# Patient Record
Sex: FEMALE | Race: WHITE | NOT HISPANIC OR LATINO | Employment: UNEMPLOYED | URBAN - METROPOLITAN AREA
[De-identification: names, ages, dates, MRNs, and addresses within clinical notes are randomized per-mention and may not be internally consistent; named-entity substitution may affect disease eponyms.]

---

## 2020-11-17 ENCOUNTER — OFFICE VISIT (OUTPATIENT)
Dept: ORTHOPEDIC SURGERY | Facility: CLINIC | Age: 50
End: 2020-11-17

## 2020-11-17 VITALS — HEART RATE: 99 BPM | WEIGHT: 252 LBS | OXYGEN SATURATION: 98 % | BODY MASS INDEX: 36.08 KG/M2 | HEIGHT: 70 IN

## 2020-11-17 DIAGNOSIS — M70.62 TROCHANTERIC BURSITIS OF LEFT HIP: Primary | ICD-10-CM

## 2020-11-17 DIAGNOSIS — M25.559 HIP PAIN: ICD-10-CM

## 2020-11-17 PROCEDURE — 20610 DRAIN/INJ JOINT/BURSA W/O US: CPT | Performed by: ORTHOPAEDIC SURGERY

## 2020-11-17 PROCEDURE — 99204 OFFICE O/P NEW MOD 45 MIN: CPT | Performed by: ORTHOPAEDIC SURGERY

## 2020-11-17 RX ORDER — PANTOPRAZOLE SODIUM 40 MG/1
TABLET, DELAYED RELEASE ORAL
COMMUNITY
Start: 2020-09-23 | End: 2021-03-04

## 2020-11-17 RX ORDER — BUPIVACAINE HYDROCHLORIDE 2.5 MG/ML
3 INJECTION, SOLUTION EPIDURAL; INFILTRATION; INTRACAUDAL
Status: COMPLETED | OUTPATIENT
Start: 2020-11-17 | End: 2020-11-17

## 2020-11-17 RX ORDER — FOSFOMYCIN TROMETHAMINE 3 G/1
POWDER ORAL
COMMUNITY
Start: 2020-09-24 | End: 2021-03-04

## 2020-11-17 RX ORDER — ASPIRIN 325 MG
325 TABLET ORAL DAILY
COMMUNITY
End: 2021-03-04

## 2020-11-17 RX ORDER — SERTRALINE HYDROCHLORIDE 100 MG/1
TABLET, FILM COATED ORAL
COMMUNITY
Start: 2020-09-23 | End: 2021-03-04

## 2020-11-17 RX ORDER — MELOXICAM 15 MG/1
TABLET ORAL
Qty: 60 TABLET | Refills: 0 | Status: SHIPPED | OUTPATIENT
Start: 2020-11-17 | End: 2021-01-11

## 2020-11-17 RX ORDER — ROSUVASTATIN CALCIUM 10 MG/1
TABLET, COATED ORAL
COMMUNITY
Start: 2020-09-23 | End: 2021-03-04

## 2020-11-17 RX ORDER — UBIDECARENONE 100 MG
100 CAPSULE ORAL DAILY
COMMUNITY
End: 2021-03-04

## 2020-11-17 RX ORDER — LIDOCAINE HYDROCHLORIDE 10 MG/ML
3 INJECTION, SOLUTION EPIDURAL; INFILTRATION; INTRACAUDAL; PERINEURAL
Status: COMPLETED | OUTPATIENT
Start: 2020-11-17 | End: 2020-11-17

## 2020-11-17 RX ORDER — TRIAMCINOLONE ACETONIDE 40 MG/ML
80 INJECTION, SUSPENSION INTRA-ARTICULAR; INTRAMUSCULAR
Status: COMPLETED | OUTPATIENT
Start: 2020-11-17 | End: 2020-11-17

## 2020-11-17 RX ADMIN — TRIAMCINOLONE ACETONIDE 80 MG: 40 INJECTION, SUSPENSION INTRA-ARTICULAR; INTRAMUSCULAR at 09:50

## 2020-11-17 RX ADMIN — BUPIVACAINE HYDROCHLORIDE 3 ML: 2.5 INJECTION, SOLUTION EPIDURAL; INFILTRATION; INTRACAUDAL at 09:50

## 2020-11-17 RX ADMIN — LIDOCAINE HYDROCHLORIDE 3 ML: 10 INJECTION, SOLUTION EPIDURAL; INFILTRATION; INTRACAUDAL; PERINEURAL at 09:50

## 2020-11-17 NOTE — PROGRESS NOTES
Procedure   Large Joint Arthrocentesis: L greater trochanteric bursa  Date/Time: 11/17/2020 9:50 AM  Consent given by: patient  Site marked: site marked  Timeout: Immediately prior to procedure a time out was called to verify the correct patient, procedure, equipment, support staff and site/side marked as required   Supporting Documentation  Indications: pain   Procedure Details  Location: hip - L greater trochanteric bursa  Preparation: Patient was prepped and draped in the usual sterile fashion  Needle size: 22 G  Approach: lateral  Medications administered: 80 mg triamcinolone acetonide 40 MG/ML; 3 mL lidocaine PF 1% 1 %; 3 mL bupivacaine (PF) 0.25 %  Patient tolerance: patient tolerated the procedure well with no immediate complications

## 2020-11-17 NOTE — PROGRESS NOTES
Orthopaedic Clinic Note: Hip New Patient    Chief Complaint   Patient presents with   • Left Hip - Pain        HPI    ELSY Yeh is a 49 y.o. female who presents with left hip pain for 3 month(s). Onset atraumatic and gradual in nature. Pain is localized to lateral trochanter and gluteal region and is a 7/10 on the pain scale.Pain is described as aching, stabbing and shooting. Associated symptoms include pain and grinding.  The pain is worse with walking, sitting, climbing stairs, sleeping and leisure; resting, heat and pain medication and/or NSAID improve the pain. Previous treatments have included: NSAIDS and physical therapy since symptom onset. Although some transient relief was reported with these interventions, these conservative measures have failed and symptoms have persisted. The patient is limited in daily activities and has had a significant decrease in quality of life as a result. She denies fevers, chills, or constitutional symptoms.    I have reviewed the following portions of the patient's history:History of Present Illness    History reviewed. No pertinent past medical history.   Past Surgical History:   Procedure Laterality Date   • BLADDER SURGERY     • BREAST AUGMENTATION     • CARDIAC CATHETERIZATION     • GALLBLADDER SURGERY     • HYSTERECTOMY     • KNEE SURGERY Left     arthroscopy   • TONSILLECTOMY AND ADENOIDECTOMY        Family History   Problem Relation Age of Onset   • Atrial fibrillation Mother    • Cancer Father      Social History     Socioeconomic History   • Marital status:      Spouse name: Not on file   • Number of children: Not on file   • Years of education: Not on file   • Highest education level: Not on file   Tobacco Use   • Smoking status: Never Smoker   • Smokeless tobacco: Never Used   Substance and Sexual Activity   • Alcohol use: Not Currently   • Drug use: Never   • Sexual activity: Defer      Current Outpatient Medications on File Prior to Visit  "  Medication Sig Dispense Refill   • aspirin 325 MG tablet Take 325 mg by mouth Daily.     • coenzyme Q10 100 MG capsule Take 100 mg by mouth Daily.     • metoprolol tartrate (LOPRESSOR) 25 MG tablet Take 25 mg by mouth 2 (Two) Times a Day.     • Monurol 3 g pack      • pantoprazole (PROTONIX) 40 MG EC tablet      • rosuvastatin (CRESTOR) 10 MG tablet      • sertraline (ZOLOFT) 100 MG tablet        No current facility-administered medications on file prior to visit.       Allergies   Allergen Reactions   • Morphine Rash   • Penicillins Rash        Review of Systems   Constitutional: Negative.    HENT: Negative.    Eyes: Negative.    Respiratory: Negative.    Cardiovascular: Negative.    Gastrointestinal: Negative.    Endocrine: Negative.    Genitourinary: Negative.    Musculoskeletal: Positive for arthralgias.   Skin: Negative.    Allergic/Immunologic: Negative.    Neurological: Negative.    Hematological: Negative.    Psychiatric/Behavioral: Negative.         The patient's Review of Systems was personally reviewed and confirmed as accurate.    The following portions of the patient's history were reviewed and updated as appropriate: allergies, current medications, past family history, past medical history, past social history, past surgical history and problem list.    Physical Exam  Pulse 99, height 177 cm (69.69\"), weight 114 kg (252 lb), SpO2 98 %.    Body mass index is 36.49 kg/m².    GENERAL APPEARANCE: awake, alert & oriented x 3, in no acute distress and well developed, well nourished  PSYCH: normal affect  LUNGS:  breathing nonlabored  EYES: sclera anicteric  CARDIOVASCULAR: palpable dorsalis pedis, palpable posterior tibial bilaterally. Capillary refill less than 2 seconds  EXTREMITIES: no clubbing, cyanosis  GAIT:  Normal           Right Hip Exam:  RANGE OF MOTION:   FLEXION CONTRACTURE: None   FLEXION: 110 degrees   INTERNAL ROTATION: 20 degrees at 90 degrees of flexion   EXTERNAL ROTATION: 40 degrees at " 90 degrees of flexion    PAIN WITH HIP MOTION: no  PAIN WITH LOGROLL: no  STINCHFIELD TEST: negative    KNEE EXAM: full knee ROM (0-120 degrees), stable to varus and valgus stress at terminal extension and 30 degrees flexion     STRENGTH:  5/5 hip adduction, abduction, flexion. 5/5 strength knee flexion, extension. 5/5 strength ankle dorsiflexion and plantarflexion.     GREATER TROCHANTER BURSAL PAIN:  yes     REFLEXES:   PATELLAR 2+/4   ACHILLES 2+/4    CLONUS: negative  STRAIGHT LEG TEST:   negative    SENSATION TO LIGHT TOUCH:  DEEP PERONEAL/SUPERFICIAL PERONEAL/SURAL/SAPHENOUS/TIBIAL:  intact    EDEMA:   no  ERYTHEMA:  no  WOUNDS/INCISIONS: no overlying skin problems.      Left Hip Exam:   RANGE OF MOTION:   FLEXION CONTRACTURE: None   FLEXION: 110 degrees   INTERNAL ROTATION: 20 degrees at 90 degrees of flexion   EXTERNAL ROTATION: 40 degrees at 90 degrees of flexion    PAIN WITH HIP MOTION: no  PAIN WITH LOGROLL: no  STINCHFIELD TEST: negative    KNEE EXAM: full knee ROM (0-120 degrees), stable to varus and valgus stress at terminal extension and 30 degrees flexion     STRENGTH:  5/5 hip adduction, abduction, flexion. 5/5 strength knee flexion, extension. 5/5 strength ankle dorsiflexion and plantarflexion.     GREATER TROCHANTER BURSAL PAIN:  yes     REFLEXES:   PATELLAR 2+/4   ACHILLES 2+/4    CLONUS: negative  STRAIGHT LEG TEST:   negative    SENSATION TO LIGHT TOUCH:  DEEP PERONEAL/SUPERFICIAL PERONEAL/SURAL/SAPHENOUS/TIBIAL:  intact    EDEMA:   no  ERYTHEMA:  no  WOUNDS/INCISIONS: no overlying skin problems.      ------------------------------------------------------------------    LEG LENGTHS:  equal  _____________________________________________________  _____________________________________________________    RADIOGRAPHIC FINDINGS:   Indication: Left hip pain    Comparison: No prior xrays are available for comparison    AP pelvis, hip 2 views: Right: mild joint space narrowing, minimal osteophyte  formation; Left: mild joint space narrowing, minimal osteophyte formation    Assessment/Plan:   Diagnosis Plan   1. Trochanteric bursitis of left hip  meloxicam (MOBIC) 15 MG tablet    Large Joint Arthrocentesis: L greater trochanteric bursa   2. Hip pain  XR Hip With or Without Pelvis 2 - 3 View Left     Patient's hip pain is due to trochanteric bursitis.  I discussed treatment options.  She is agreeable to trochanteric bursa cortisone injection today as well as prescription anti-inflammatory.  She will follow-up in 2 months for repeat assessment.    Williams Lyons MD  11/17/20  10:00 EST

## 2021-01-09 DIAGNOSIS — M70.62 TROCHANTERIC BURSITIS OF LEFT HIP: ICD-10-CM

## 2021-01-11 RX ORDER — MELOXICAM 15 MG/1
TABLET ORAL
Qty: 60 TABLET | Refills: 0 | Status: SHIPPED | OUTPATIENT
Start: 2021-01-11 | End: 2021-02-09

## 2021-02-09 DIAGNOSIS — M70.62 TROCHANTERIC BURSITIS OF LEFT HIP: ICD-10-CM

## 2021-02-09 RX ORDER — MELOXICAM 15 MG/1
TABLET ORAL
Qty: 60 TABLET | Refills: 0 | Status: SHIPPED | OUTPATIENT
Start: 2021-02-09 | End: 2021-03-04

## 2021-03-04 ENCOUNTER — OFFICE VISIT (OUTPATIENT)
Dept: OBSTETRICS AND GYNECOLOGY | Facility: CLINIC | Age: 51
End: 2021-03-04

## 2021-03-04 VITALS — HEIGHT: 71 IN | WEIGHT: 253 LBS | BODY MASS INDEX: 35.42 KG/M2

## 2021-03-04 DIAGNOSIS — Z01.419 ENCOUNTER FOR ANNUAL ROUTINE GYNECOLOGICAL EXAMINATION: Primary | ICD-10-CM

## 2021-03-04 PROCEDURE — 99386 PREV VISIT NEW AGE 40-64: CPT | Performed by: OBSTETRICS & GYNECOLOGY

## 2021-03-04 RX ORDER — SERTRALINE HYDROCHLORIDE 100 MG/1
TABLET, FILM COATED ORAL
COMMUNITY

## 2021-03-04 RX ORDER — METOPROLOL SUCCINATE 25 MG/1
TABLET, EXTENDED RELEASE ORAL
COMMUNITY

## 2021-03-04 RX ORDER — CONJUGATED ESTROGENS 0.62 MG/G
CREAM VAGINAL
COMMUNITY
End: 2021-03-04 | Stop reason: SDUPTHER

## 2021-03-04 RX ORDER — CONJUGATED ESTROGENS 0.62 MG/G
CREAM VAGINAL 2 TIMES WEEKLY
Qty: 1 EACH | Refills: 6 | Status: SHIPPED | OUTPATIENT
Start: 2021-03-04

## 2021-03-04 RX ORDER — ROSUVASTATIN CALCIUM 10 MG/1
TABLET, COATED ORAL
COMMUNITY

## 2021-03-04 RX ORDER — MULTIPLE VITAMINS W/ MINERALS TAB 9MG-400MCG
TAB ORAL
COMMUNITY

## 2021-03-04 RX ORDER — ESOMEPRAZOLE MAGNESIUM 40 MG/1
CAPSULE, DELAYED RELEASE ORAL
COMMUNITY

## 2021-03-04 RX ORDER — CHOLECALCIFEROL (VITAMIN D3) 10(400)/ML
DROPS ORAL
COMMUNITY

## 2021-03-04 NOTE — PROGRESS NOTES
GYN Annual Exam     CC - Here for Annual Exam.     Subjective     HPI  Irma Yeh is a 50 y.o. female, , who presents for annual well woman exam.  She is postmenopausal.  Patient reports problems with: none.  Partner Status: Marital Status: .  New Partners since last visit: no.   She Reports vasomotor symptoms.  She denies  issues with urinary incontinence.     The patient has any complaints today. Patient reports constant UTI's. Last treatment was four weeks ago. She had urosepsis in 2019, and is concerned about recurrent UTIs. Has est care with  urology. She is on premarin vaginal cream per urology. 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 She and her  have moved from [place], her  at [facility].     She exercises regularly: yes.  She has concerns about domestic violence: no.        Additional OB/GYN History       Last Pap :   Last Completed Pap Smear       Status Date      PAP SMEAR No completions recorded        History of abnormal Pap smear: yes - around age 19  Family history of uterine, colon, breast, or ovarian cancer: no  Performs monthly Self-Breast Exam: no  Last mammogram:   Last Completed Mammogram       Status Date      MAMMOGRAM Done 2019 normal        Last colonoscopy: Never   Last Completed Colonoscopy       Status Date      COLONOSCOPY No completions recorded        Last DEXA: about three years ago. Normal   Exercises Regularly: yes  Feelings of Anxiety or Depression: yes - treated    Tobacco Usage?: No   OB History        3    Para   2    Term   2            AB   1    Living   2       SAB   1    TAB        Ectopic        Molar        Multiple        Live Births   2                Health Maintenance   Topic Date Due   • COLONOSCOPY  1969   • ANNUAL PHYSICAL  1972   • TDAP/TD VACCINES (1 - Tdap) 1988   • ZOSTER VACCINE (1 of 2) 2019   • INFLUENZA VACCINE  2020   • HEPATITIS C SCREENING  2020   • PAP SMEAR  " 11/17/2020   • MAMMOGRAM  02/01/2021   • Annual Gynecologic Pelvic and Breast Exam  03/05/2022   • Pneumococcal Vaccine 0-64  Aged Out   • MENINGOCOCCAL VACCINE  Aged Out       The additional following portions of the patient's history were reviewed and updated as appropriate: allergies, current medications, past family history, past medical history, past social history, past surgical history and problem list.    Past Medical History:   Diagnosis Date   • H/O blood clots         Past Surgical History:   Procedure Laterality Date   • BLADDER REPAIR     • BLADDER SURGERY     • BREAST AUGMENTATION     • CARDIAC CATHETERIZATION     • GALLBLADDER SURGERY     • HYSTERECTOMY     • KNEE SURGERY Left     arthroscopy   • TONSILLECTOMY AND ADENOIDECTOMY          Review of Systems   Constitutional: Negative.    HENT: Negative.    Eyes: Negative.    Respiratory: Negative.    Cardiovascular: Negative.    Gastrointestinal: Negative.    Endocrine: Negative.    Genitourinary: Negative.    Musculoskeletal: Negative.    Skin: Negative.    Allergic/Immunologic: Negative.    Neurological: Negative.    Hematological: Negative.    Psychiatric/Behavioral: Negative.        I have reviewed and agree with the HPI, ROS, and historical information as entered above. Brenda Becerra MD    Objective   Ht 180.3 cm (71\")   Wt 115 kg (253 lb)   LMP  (LMP Unknown)   BMI 35.29 kg/m²     Physical Exam  Vitals signs and nursing note reviewed. Exam conducted with a chaperone present.   Constitutional:       Appearance: She is well-developed.   HENT:      Head: Normocephalic and atraumatic.   Eyes:      Pupils: Pupils are equal, round, and reactive to light.   Neck:      Musculoskeletal: Normal range of motion. No muscular tenderness.      Thyroid: No thyroid mass or thyromegaly.   Pulmonary:      Effort: Pulmonary effort is normal. No retractions.   Chest:      Chest wall: No mass.      Breasts:         Right: Normal. No mass, nipple discharge, skin " change or tenderness.         Left: Normal. No mass, nipple discharge, skin change or tenderness.   Abdominal:      General: Bowel sounds are normal.      Palpations: Abdomen is soft. Abdomen is not rigid. There is no mass.      Tenderness: There is no abdominal tenderness. There is no guarding.      Hernia: No hernia is present. There is no hernia in the left inguinal area or right inguinal area.   Genitourinary:     Exam position: Lithotomy position.      Pubic Area: No rash.       Labia:         Right: No rash, tenderness or lesion.         Left: No rash, tenderness or lesion.       Urethra: No urethral pain or urethral swelling.      Vagina: Normal. No vaginal discharge or lesions.      Cervix: No cervical motion tenderness, discharge, lesion or cervical bleeding.      Uterus: Normal. Not enlarged, not fixed and not tender.       Adnexa:         Right: No mass, tenderness or fullness.          Left: No mass, tenderness or fullness.        Rectum: No external hemorrhoid.   Musculoskeletal:      Right lower leg: No edema.      Left lower leg: No edema.   Skin:     General: Skin is warm and dry.   Neurological:      Mental Status: She is alert and oriented to person, place, and time.      Motor: Motor function is intact.   Psychiatric:         Mood and Affect: Mood and affect normal.         Behavior: Behavior normal.         Assessment/Plan     Assessment     Problem List Items Addressed This Visit     None      Visit Diagnoses     Encounter for annual routine gynecological examination    -  Primary    Relevant Orders    Liquid-based Pap Smear, Screening    Mammo Screening Digital Tomosynthesis Bilateral With CAD    DEXA Bone Density Axial          Plan     1. Reviewed monthly self breast exams.  Instructed to call with lumps, pain, or breast discharge.  Yearly mammograms ordered.  2. Ordered mammogram today.  3. Recommended use of Vitamin D and getting adequate calcium in her diet. (1500mg)  4. Osteoporosis  screening ordered today.  5. Colonoscopy recommended.  6. RTC in 1 year or PRN with problems.      Brenda Becerra MD  03/04/2021

## 2021-03-10 DIAGNOSIS — Z01.419 ENCOUNTER FOR ANNUAL ROUTINE GYNECOLOGICAL EXAMINATION: ICD-10-CM

## 2021-03-13 ENCOUNTER — APPOINTMENT (OUTPATIENT)
Dept: MAMMOGRAPHY | Facility: HOSPITAL | Age: 51
End: 2021-03-13

## 2021-03-19 ENCOUNTER — APPOINTMENT (OUTPATIENT)
Dept: MAMMOGRAPHY | Facility: HOSPITAL | Age: 51
End: 2021-03-19

## 2021-03-25 ENCOUNTER — APPOINTMENT (OUTPATIENT)
Dept: OTHER | Facility: HOSPITAL | Age: 51
End: 2021-03-25

## 2021-03-25 ENCOUNTER — HOSPITAL ENCOUNTER (OUTPATIENT)
Dept: MAMMOGRAPHY | Facility: HOSPITAL | Age: 51
Discharge: HOME | End: 2021-03-25
Admitting: OBSTETRICS & GYNECOLOGY

## 2021-03-25 DIAGNOSIS — Z01.419 ENCOUNTER FOR ANNUAL ROUTINE GYNECOLOGICAL EXAMINATION: ICD-10-CM

## 2021-03-25 DIAGNOSIS — Z92.89 H/O MAMMOGRAM: ICD-10-CM

## 2021-03-25 PROCEDURE — 77067 SCR MAMMO BI INCL CAD: CPT | Performed by: RADIOLOGY

## 2021-03-25 PROCEDURE — 77063 BREAST TOMOSYNTHESIS BI: CPT | Performed by: RADIOLOGY

## 2021-03-25 PROCEDURE — 77067 SCR MAMMO BI INCL CAD: CPT

## 2021-03-25 PROCEDURE — 77063 BREAST TOMOSYNTHESIS BI: CPT

## 2021-08-02 ENCOUNTER — LAB (OUTPATIENT)
Dept: LAB | Facility: HOSPITAL | Age: 51
End: 2021-08-02

## 2021-08-02 ENCOUNTER — OFFICE VISIT (OUTPATIENT)
Dept: FAMILY MEDICINE CLINIC | Facility: CLINIC | Age: 51
End: 2021-08-02

## 2021-08-02 VITALS
OXYGEN SATURATION: 94 % | BODY MASS INDEX: 35.11 KG/M2 | SYSTOLIC BLOOD PRESSURE: 118 MMHG | HEART RATE: 80 BPM | DIASTOLIC BLOOD PRESSURE: 74 MMHG | HEIGHT: 71 IN | RESPIRATION RATE: 20 BRPM | WEIGHT: 250.8 LBS

## 2021-08-02 DIAGNOSIS — Z12.11 COLON CANCER SCREENING: ICD-10-CM

## 2021-08-02 DIAGNOSIS — J00 ACUTE NASOPHARYNGITIS: Primary | ICD-10-CM

## 2021-08-02 DIAGNOSIS — J00 ACUTE NASOPHARYNGITIS: ICD-10-CM

## 2021-08-02 PROCEDURE — U0004 COV-19 TEST NON-CDC HGH THRU: HCPCS

## 2021-08-02 PROCEDURE — 99203 OFFICE O/P NEW LOW 30 MIN: CPT | Performed by: FAMILY MEDICINE

## 2021-08-02 RX ORDER — ASPIRIN 325 MG
325 TABLET, DELAYED RELEASE (ENTERIC COATED) ORAL DAILY
COMMUNITY

## 2021-08-02 RX ORDER — UBIDECARENONE 100 MG
100 CAPSULE ORAL DAILY
COMMUNITY

## 2021-08-02 NOTE — PROGRESS NOTES
"Subjective   Irma Yeh is a 50 y.o. female.     Chief Complaint   Patient presents with   • Establish Care   • Sore Throat   • Headache   • Diarrhea       History of Present Illness     Previous primary care: She sees a physician in [place], [place].    Chronic health conditions:  Kidney stones, tendinitis, and bursitis in left leg.    Other physicians currently involved in patient's care:  Dr. Becerra, Dr. Solis, Dr. Ramires, Dr. Mortensen.    Acute complaints:  Irma Yeh is a 50 y.o. female who presents today to establish care.   works for [employer], so they moved around a lot, so home is in [place]. She states she has not had a colonoscopy in the past. She had her physical exam 1 year ago.     She reports that she was taking B12 injections for a while after her hysterectomy due to fatigue. During that time, she states she was admitted to the hospital in [place] for SVT, so she stopped taking the B12 because she thought that may have caused her SVT. She states that she now takes vitamin D, and a multivitamin. She denies having any further episodes of SVT.    She saw Dr. Becerra for her yearly Pap test, and mammogram. She does not see anyone for the urinary tract infections or the 2 kidney stones because they do not bother her.     She reports approximately 2.5 years ago she went to her child's conference at school, and afterward she was experiencing pain in her fingers, and her throat was \"scratchy.\" She states that she was quivering, and shaking, and the next thing she knew, she was in an ambulance, and found to be septic with a UTI, and strep throat.  This occurred in 05/2018.    She states she had blood clots that occurred after she had her hysterectomy; she also had a gallbladder repair, hernia repair, and a rectocele all at the same time. She adds that she was still on birth control until that time. She completed 3 months of Xarelto. This occurred in 2011.    She adds that she " takes Zoloft prescribed by Dr. Becerra, her OB/GYN.    She states that she sees a cardiologist because she had an ablation in 2001, due to atrial ectopic tachycardia at [facility]. When she had her first child, there was a question of fluid overload, and cardiomyopathy, but they think it was just fluid overload because her EF resolved. She takes metoprolol, CoQ10, and Crestor for coverage. She gets an echocardiogram every year in [place]. She states that she sees Dr. Ramires, and Dr. Solis at .      She reports that on Friday, she had diarrhea, sore throat, and headache for 3 days. She had a strep test, and a COVID test, which were negative. She reports her throat is still sore. She adds that she no longer has the loose stools. She reports that her child had similar symptoms. She denies having a fever.     She states she had her tonsils removed when she was a baby.    She denies having a family history of colon cancer.     She states she has not had her COVID vaccine. She adds that she used to work for [employer]. She spoke to Dr. Mortensen, who told her it would be the safest vaccine for her. The next day is when she had the blood clot, so did not take the vaccine. She waited, and talked to him again, and she was advised to wear a mask, wash her hands, and stay out of crowds.     She reports she tore her gluteus medius before she moved here, and states that she has tendinitis, and bursitis in her left leg, and has received injections for that, and physical therapy.    Taco Yeh is a 50-year-old female who is present today to establish care. Her  works for [employer], so they moved around a lot, so home is in [place]. She states she has not had a colonoscopy in the past. She had her physical exam 1 year ago.     She reports that she was taking B12 injections for a while after her hysterectomy due to fatigue. During that time, she states she was admitted to the hospital in [place] for SVT,  "so she stopped taking the B12 because she thought that may have caused her SVT. She states that she now takes vitamin D, and a multivitamin. She denies having any further episodes of SVT.    She saw Dr. Becerra for her yearly Pap test, and mammogram. She does not see anyone for the urinary tract infections or the 2 kidney stones because they do not bother her.     She reports approximately 2.5 years ago she went to her child's conference at school, and afterward she was experiencing pain in her fingers, and her throat was \"scratchy.\" She states that she was quivering, and shaking, and the next thing she knew, she was in an ambulance, and found to be septic with a UTI, and strep throat.  This occurred in 05/2018.    She states she had blood clots that occurred after she had her hysterectomy; she also had a gallbladder repair, hernia repair, and a rectocele all at the same time. She adds that she was still on birth control until that time. She completed 3 months of Xarelto. This occurred in 2011.    She adds that she takes Zoloft prescribed by Dr. Becerra, her OB/GYN.    She states that she sees a cardiologist because she had an ablation in 2001, due to atrial ectopic tachycardia at [facility]. When she had her first child, there was a question of fluid overload, and cardiomyopathy, but they think it was just fluid overload because her EF resolved. She takes metoprolol, CoQ10, and Crestor for coverage. She gets an echocardiogram every year in [place]. She states that she sees Dr. Ramires, and Dr. Solis at .      She reports that on Friday, she had diarrhea, sore throat, and headache for 3 days. She had a strep test, and a COVID test, which were negative. She reports her throat is still sore. She adds that she no longer has the loose stools. She reports that her child had similar symptoms. She denies having a fever.     She states she had her tonsils removed when she was a baby.    She denies having a family history of " colon cancer.     She states she has not had her COVID vaccine. She adds that she used to work for [employer]. She spoke to Dr. Mortensen, who told her it would be the safest vaccine for her. The next day is when she had the blood clot, so did not take the vaccine. She waited, and talked to him again, and she was advised to wear a mask, wash her hands, and stay out of crowds.     She reports she tore her gluteus medius before she moved here, and states that she has tendinitis, and bursitis in her left leg, and has received injections for that, and physical therapy.        This patient is accompanied by their self who contributes to the history of their care.    The following portions of the patient's history were reviewed and updated as appropriate: allergies, current medications, past family history, past medical history, past social history, past surgical history and problem list.    Active Ambulatory Problems     Diagnosis Date Noted   • No Active Ambulatory Problems     Resolved Ambulatory Problems     Diagnosis Date Noted   • No Resolved Ambulatory Problems     Past Medical History:   Diagnosis Date   • H/O blood clots 2011   • Hyperlipidemia      Past Surgical History:   Procedure Laterality Date   • BLADDER REPAIR     • BLADDER SURGERY     • BREAST AUGMENTATION     • CARDIAC CATHETERIZATION     • GALLBLADDER SURGERY     • HYSTERECTOMY     • KNEE SURGERY Left     arthroscopy   • OOPHORECTOMY     • REDUCTION MAMMAPLASTY Bilateral 2011   • TONSILLECTOMY AND ADENOIDECTOMY       Family History   Problem Relation Age of Onset   • Atrial fibrillation Mother    • Cancer Father         prostates and thyroid     Social History     Socioeconomic History   • Marital status:      Spouse name: Not on file   • Number of children: Not on file   • Years of education: Not on file   • Highest education level: Not on file   Tobacco Use   • Smoking status: Never Smoker   • Smokeless tobacco: Never Used   Vaping  "Use   • Vaping Use: Never used   Substance and Sexual Activity   • Alcohol use: Not Currently   • Drug use: Never   • Sexual activity: Yes     Partners: Male       Review of Systems  See HPI and new patient paperwork scanned into chart    Objective   Blood pressure 118/74, pulse 80, resp. rate 20, height 180.3 cm (70.98\"), weight 114 kg (250 lb 12.8 oz), SpO2 94 %.  Nursing note reviewed  Physical Exam  Constitutional:       Appearance: Normal appearance.   HENT:      Head: Normocephalic and atraumatic.      Nose: Congestion present.      Mouth/Throat:      Mouth: Mucous membranes are dry.      Comments: 1+ PND  Eyes:      Extraocular Movements: Extraocular movements intact.      Pupils: Pupils are equal, round, and reactive to light.   Cardiovascular:      Rate and Rhythm: Normal rate.      Pulses: Normal pulses.   Pulmonary:      Effort: Pulmonary effort is normal. No respiratory distress.      Breath sounds: Normal breath sounds. No stridor.   Neurological:      Mental Status: She is alert.         Procedures  Assessment/Plan   Problem List Items Addressed This Visit     None      Visit Diagnoses     Acute nasopharyngitis    -  Primary    Relevant Orders    COVID-19 PCR, [facility], NP SWAB IN [facility]/ORAL SWISH 24-30 HR TAT - Swab, Nasopharynx    Colon cancer screening        Relevant Orders    Ambulatory Referral For Screening Colonoscopy      1. Nasopharyngitis  - She certainly appears viral. I recommend a repeat COVID swab today since she has some possible exposure to her , and the fact that she had her initial test done within 24 hours of starting symptoms.   -She has not been vaccinated, and I recommended this    2. Colon cancer screening   - Referral placed today.     3. Hyperlipidemia   - She is on rosuvastatin 10 mg daily for primary prevention. She also takes CoQ10, and vitamin D.     4. History of SVT, and atrial ectopic tachycardia.   - Continue follow-up with cardiology in " [place], continue Toprol XL 25 mg daily.     She will follow up as needed.      We will plan to obtain previous records both for chronic preventative care as well as those related to the current episode of care.  Any records that the patient brought with her today were reviewed personally by me during the visit today and will be scanned into the chart for posterity.    Discussed the nature of the disease including relevant anatomy & expected clinical course, risks, complications, implications, management, safe and proper use of medications. Encouraged therapeutic lifestyle changes including low calorie diet with plenty of fruits and vegetables, daily exercise, medication compliance, and keeping scheduled follow up appointments with me and any other providers. Encouraged patient to have appointment for complete physical, fasting labs, appropriate screenings, and immunizations on an annual basis. Discussed extended office hours, shared call, and appropriate use of the ER. Discussed generally we do not prescribe chronic controlled substances from this office. Appropriate referrals will be made to pain management and psychiatry if needed. Stressed the importance and expectation of medical compliance with plan of care, medications, and follow up appointments.    There are no Patient Instructions on file for this visit.    No follow-ups on file.    Ambulatory progress note signed and attested to by Pro Cerda D.O.         Scribed for Pro Cerda DO by oGldie Herrera.  08/02/21   12:07 EDT    I have personally performed the services described in this document as scribed by the above individual, and it is both accurate and complete.  Pro Cerda DO  8/2/2021  13:42 EDT

## 2021-08-03 LAB — SARS-COV-2 RNA NOSE QL NAA+PROBE: NOT DETECTED

## 2021-08-25 RX ORDER — SODIUM, POTASSIUM,MAG SULFATES 17.5-3.13G
SOLUTION, RECONSTITUTED, ORAL ORAL
Qty: 1 ML | Refills: 0 | Status: SHIPPED | OUTPATIENT
Start: 2021-08-25

## 2021-09-02 ENCOUNTER — OUTSIDE FACILITY SERVICE (OUTPATIENT)
Dept: GASTROENTEROLOGY | Facility: CLINIC | Age: 51
End: 2021-09-02

## 2021-09-02 PROCEDURE — 45378 DIAGNOSTIC COLONOSCOPY: CPT | Performed by: INTERNAL MEDICINE

## 2021-09-08 ENCOUNTER — OFFICE VISIT (OUTPATIENT)
Dept: ORTHOPEDIC SURGERY | Facility: CLINIC | Age: 51
End: 2021-09-08

## 2021-09-08 VITALS
DIASTOLIC BLOOD PRESSURE: 80 MMHG | BODY MASS INDEX: 34.44 KG/M2 | HEART RATE: 82 BPM | SYSTOLIC BLOOD PRESSURE: 113 MMHG | HEIGHT: 71 IN | WEIGHT: 246 LBS

## 2021-09-08 DIAGNOSIS — S93.402A SPRAIN OF LEFT ANKLE, UNSPECIFIED LIGAMENT, INITIAL ENCOUNTER: ICD-10-CM

## 2021-09-08 DIAGNOSIS — M25.572 LEFT ANKLE PAIN, UNSPECIFIED CHRONICITY: Primary | ICD-10-CM

## 2021-09-08 DIAGNOSIS — S93.602A FOOT SPRAIN, LEFT, INITIAL ENCOUNTER: ICD-10-CM

## 2021-09-08 PROCEDURE — 99213 OFFICE O/P EST LOW 20 MIN: CPT | Performed by: PHYSICIAN ASSISTANT

## 2021-09-08 NOTE — PROGRESS NOTES
[facility] Note        Subjective     Pain of the Left Ankle      HPI    Irma Yeh is a 50 y.o. female. Very pleasant patient here to discuss her left ankle pain.  She complains of pain since 9/4/2021 when [place].  Pain is 5/10 and aching and dull.  Functional pain as well as night pain.  Treated with a short boot.  Also ice elevation anti-inflammatories are here for further evaluation    Past Medical History:   Diagnosis Date   • Acid reflux    • H/O blood clots 2011    Provoked post-op, completed 3mo Xarelto   • Hyperlipidemia     Rosuvastatin, CoQ10 primary prevention      Past Surgical History:   Procedure Laterality Date   • BLADDER REPAIR     • BLADDER SURGERY     • BREAST AUGMENTATION     • CARDIAC ABLATION     • CARDIAC CATHETERIZATION     • FOOT SURGERY     • GALLBLADDER SURGERY     • HERNIA REPAIR     • HYSTERECTOMY     • KNEE SURGERY Left     arthroscopy   • OOPHORECTOMY     • REDUCTION MAMMAPLASTY Bilateral 2011   • TONSILLECTOMY AND ADENOIDECTOMY        Family History   Problem Relation Age of Onset   • Atrial fibrillation Mother    • Cancer Father         prostates and thyroid     Social History     Socioeconomic History   • Marital status:      Spouse name: Not on file   • Number of children: Not on file   • Years of education: Not on file   • Highest education level: Not on file   Tobacco Use   • Smoking status: Never Smoker   • Smokeless tobacco: Never Used   Vaping Use   • Vaping Use: Never used   Substance and Sexual Activity   • Alcohol use: Not Currently   • Drug use: Never   • Sexual activity: Yes     Partners: Male      Current Outpatient Medications on File Prior to Visit   Medication Sig Dispense Refill   • aspirin  MG tablet Take 325 mg by mouth Daily.     • Cholecalciferol (Vitamin D) 10 MCG/ML liquid Vitamin D     • coenzyme Q10 100 MG capsule Take 100 mg by mouth Daily.     • conjugated  "estrogens (Premarin) 0.625 MG/GM vaginal cream Insert  into the vagina 2 (Two) Times a Week. 1 each 6   • esomeprazole (nexIUM) 40 MG capsule Nexium 40 mg capsule,delayed release   Take 1 capsule every day by oral route.     • metoprolol succinate XL (TOPROL-XL) 25 MG 24 hr tablet metoprolol succinate er 25 mg tb24     • multivitamin with minerals (MULTIVITAMIN ADULT PO) multivitamin     • rosuvastatin (Crestor) 10 MG tablet Crestor     • sertraline (ZOLOFT) 100 MG tablet sertraline hydrochloride 100 mg tabs     • sodium-potassium-magnesium sulfates (Suprep Bowel Prep Kit) 17.5-3.13-1.6 GM/177ML solution oral solution Use as directed by provider for colonoscopy prep 1 mL 0     No current facility-administered medications on file prior to visit.      Allergies   Allergen Reactions   • Shrimp Anaphylaxis   • Shrimp Extract Allergy Skin Test Anaphylaxis   • Wound Dressing Adhesive Hives and Rash     liquid   • Adhesive Tape Rash     liquid   • Azithromycin Rash   • Clindamycin Rash   • Doxycycline Rash   • Latex Rash   • Morphine Rash   • Penicillins Rash   • Tetracycline Rash          Review of Systems   Constitutional: Negative.    HENT: Negative.    Eyes: Negative.    Respiratory: Negative.    Cardiovascular: Negative.    Gastrointestinal: Negative.    Endocrine: Negative.    Genitourinary: Negative.    Musculoskeletal: Positive for arthralgias.   Skin: Negative.    Allergic/Immunologic: Negative.    Neurological: Negative.    Hematological: Negative.    Psychiatric/Behavioral: Negative.         I reviewed the patient's chief complaint, history of present illness, review of systems, past medical history, surgical history, family history, social history, medications and allergy list.        Objective      Physical Exam  /80   Pulse 82   Ht 180.3 cm (70.98\")   Wt 112 kg (246 lb)   LMP  (LMP Unknown)   BMI 34.33 kg/m²     Body mass index is 34.33 kg/m².    General  Mental Status - alert  General Appearance - " cooperative, well groomed, not in acute distress  Orientation - Oriented X3  Build & Nutrition - well developed and well nourished  Posture - normal posture  Gait -mildly antalgic with boot       Ortho Exam  V:  Dorsalis Pedis:   Left:2+    Posterior Tibial:Left:2+    Capillary Refill:  Brisk  MSK:  Tibia:   Left:  non tender      Ankle:   Left:  tender Over the ATFL and anterior ankle joint line, ROM  normal and motor function  normal      Foot:   Left:  tender Over the midfoot with no Lisfranc tenderness      NEURO: [place]-Rui 5.07 monofilament test: not evaluated    Lower extremity sensation: intact     Calf Atrophy:none    Motor Function: all 5/5            Imaging/Studies  Imaging Results (Last 24 Hours)     ** No results found for the last 24 hours. **            Assessment    Assessment:  1. Left ankle pain, unspecified chronicity    2. Sprain of left ankle, unspecified ligament, initial encounter    3. Foot sprain, left, initial encounter          Plan:  1. Recommend over-the-counter medication as needed for discomfort  2. Left foot and ankle sprain.  I reviewed today's x-rays clinical findings past and current treatment patient.  X-rays show never no evidence of acute bony findings with degenerative changes in the midfoot.  I think her foot pain is secondary to a sprain.  She is already doing physical therapy for trochanteric bursitis. I given her prescription to addt her foot and ankle to the regimen.  I will see her back in 6 weeks or sooner if needed    Patient history, diagnosis and treatment plan discussed with Dr. Weiner.          Monica Winter PA-C  09/10/21  05:44 EDT